# Patient Record
Sex: MALE | Race: WHITE
[De-identification: names, ages, dates, MRNs, and addresses within clinical notes are randomized per-mention and may not be internally consistent; named-entity substitution may affect disease eponyms.]

---

## 2017-11-01 NOTE — PCM.PREANE
Preanesthetic Assessment





- Anesthesia/Transfusion/Family Hx


Anesthesia History: Prior Anesthesia Without Reaction


Family History of Anesthesia Reaction: No


Transfusion History: No Prior Transfusion(s)





- Review of Systems


General: No Symptoms


Pulmonary: No Symptoms


Cardiovascular: No Symptoms


Gastrointestinal: No Symptoms


Neurological: No Symptoms


Other: Reports: None





- Physical Assessment


NPO Status Date: 10/31/17


O2 Sat by Pulse Oximetry: 97


Respiratory Rate: 16


Vital Signs: 





 Last Vital Signs











Temp  36.6 C   11/01/17 08:22


 


Pulse  91   11/01/17 08:22


 


Resp  16   11/01/17 08:22


 


BP  145/90 H  11/01/17 08:22


 


Pulse Ox  97   11/01/17 08:22











Height: 1.71 m


Weight: 65.317 kg


ASA Class: 2


Mental Status: Alert & Oriented x3


Dentition: Reports: Dentures, Partial


ROM/Head Extension: Full


Lungs: Clear to Auscultation, Normal Respiratory Effort


Cardiovascular: Regular Rate, Regular Rhythm





- Allergies


Allergies/Adverse Reactions: 


 Allergies











Allergy/AdvReac Type Severity Reaction Status Date / Time


 


No Known Allergies Allergy   Verified 11/03/14 10:50














- Anesthesia Plan


Pre-Op Medication Ordered: None





- Acknowledgements


Anesthesia Type Planned: General Anesthesia


Pt an Appropriate Candidate for the Planned Anesthesia: Yes


Alternatives and Risks of Anesthesia Discussed w Pt/Guardian: Yes


Pt/Guardian Understands and Agrees with Anesthesia Plan: Yes





PreAnesthesia Questionnaire


HEENT History: Reports: Other (See Below)


Other HEENT History: top and bottom denture


Cardiovascular History: Reports: Hypertension


Respiratory History: Reports: None


Gastrointestinal History: Reports: Hepatitis, Other (See Below)


Other Gastrointestinal History: occasional heartburn, hx hepatitis C but has 

been on medication for this and no longer has


Genitourinary History: Reports: None


Musculoskeletal History: Reports: Other (See Below)


Other Musculoskeletal History: chronic rt shoulder pain


Dermatologic History: Reports: Psoriasis





- Past Surgical History


Head Surgeries/Procedures: Reports: None


GI Surgical History: Reports: Colonoscopy, EGD





- SUBSTANCE USE


Smoking Status *Q: Current Every Day Smoker


Days Per Week of Alcohol Use: 1


Number of Drinks Per Day: 2


Total Drinks Per Week: 2


Recreational Drug Use History: No





- HOME MEDS


Home Medications: 


 Home Meds





Etanercept [Enbrel] 1 injection IM ASDIRECTED 11/03/14 [History]


Lisinopril 40 mg PO DAILY 11/03/14 [History]











- CURRENT (IN HOUSE) MEDS


Current Meds: 





 Current Medications





Lactated Ringer's (Ringers, Lactated)  1,000 mls @ 125 mls/hr IV ASDIRECTED WILBERTO


   Last Admin: 11/01/17 08:19 Dose:  125 mls/hr


Sodium Chloride (Saline Flush)  10 ml FLUSH ASDIRECTED PRN


   PRN Reason: Keep Vein Open


Sodium Chloride (Saline Flush)  2.5 ml FLUSH ASDIRECTED PRN


   PRN Reason: Keep Vein Open





Discontinued Medications





Bupivacaine HCl/Epinephrine Bitart (Marcaine 0.25%/Epinephrine 1:200,000) 

Confirm Administered Dose 20 ml .ROUTE .STK-MED ONE


   Stop: 11/01/17 07:27


Cefazolin Sodium/Dextrose 2 gm (/ Premix)  50 mls @ 100 mls/hr IV ONETIME ONE


   Stop: 10/28/17 20:21

## 2017-11-01 NOTE — OR
SURGEON:

CELINA FLORES MD

 

DATE OF PROCEDURE:  11/01/2017

 

PREOPERATIVE DIAGNOSIS:

Incarcerated right inguinal hernia.

 

POSTOPERATIVE DIAGNOSIS:

Incarcerated right inguinal hernia.

 

PROCEDURE PERFORMED:

Right inguinal hernia repair with mesh.

 

ANESTHESIA:

General endotracheal anesthesia.

 

FLUIDS:

See the anesthesia record.

 

ESTIMATED BLOOD LOSS:

10 mL.

 

COMPLICATIONS:

None.

 

INDICATIONS:

The patient is a 57-year-old male with a right inguinal pain for several months.

He was originally diagnosed with epididymitis.  The inflammation did not go

away, so he was seen by a urologist.  The urologist noted a high-riding right

testicle, but did not feel he had epididymitis.  A CT of the abdomen and pelvis

was performed that showed a right inguinal hernia containing incarcerated and

inflamed omentum.  The patient saw me in clinic and we discussed the need for an

urgent repair.  The patient and I discussed the procedure as well as expected

perioperative course.  We discussed the risks, including bleeding, infection, or

damage to surrounding structures, including loss of blood flow to the testicle.

The patient verbalized understanding and wishes to proceed.

 

PROCEDURE IN DETAIL:

The patient was brought to the OR and placed on the OR table in supine position.

A time-out was completed verifying the patient's name, age, date of birth,

allergies, and procedure to be performed.  General endotracheal anesthesia was

induced.  The abdomen, groin, and genitalia were prepped and draped in usual

standard fashion.  First the skin was anesthetized with 0.5% Marcaine plain.

Two fingerbreadths above the pubic tubercle, a transverse incision was made

using a 15 blade scalpel approximately 6 cm in length.  Dissection was carried

down with electrocautery through Ronaldo's fascia maintaining hemostasis.  Once

the external oblique was identified, it was incised along the length of its

fibers with a 15 blade scalpel.  Metzenbaum scissors were then used to extend

the incision in both directions opening up the external oblique down to the

external ring.  The external oblique was grasped with hemostats on both sides.

The cord, cord structures, as well as hernia sac were freed up circumferentially

and a Penrose drain was placed around it.  The hernia sac was identified and the

anterior medial portion of the hernia sac was stripped down.  The cord

structures were noted to be thickened and woody.  I was unable to discern the

spermatic cord from the testicular vessels.  I followed the cord structures

through the internal ring and along their course in the peritoneum to confirm

that the structure identified was the spermatic cord and vessels.  I then 
followed

this along structure down to the testicle.  The testicle was again noted to be

high riding.  The hernia sac was thin walled and contained a large amount of

omentum.  The distal aspect of this omentum appeared slightly necrotic.  I

attempted to dissect the distal aspect of the hernia sac off the cord

structures, but due to the amount of inflammation and edema, I was unable to do

so safely.  Dr. Sergio Reardon, was asked to consult on the case and verify my

anatomy.  We both agreed that the safest course of action was to leave a small

amount of necrotic omentum rather than risk compromising the testicle.  Using

cautery, I divided the omentum as close to the necrotic tissue as possible.  The

ends of this cut omentum were then tied with 2-0 silk.  The remainder of the

omentum was then dissected free of the cord structures and reduced into the

abdomen.  A Doppler was brought in to verify that there was still good blood

flow going to the testicle.  A good pulse was noted along the woody and inflamed

spermatic cord.  A large plug and patch was brought into the field.  The plug

was placed in the internal ring and secured to the transversalis fascia with 0

Ethibond sutures.  The mesh was secured to the pubic tubercle medially along the

ilioinguinal ligament inferiorly and along the conjoined tendon superiorly

making a slit for the cord and cord structures.  Once the mesh was secured in

place, the external oblique was closed over the roof with a running 0 Vicryl

suture.  This was performed in a manner to not strangulate the cord and to

recreate the external ring.  After injecting the external oblique with Marcaine,

the Ronaldo's fascia was approximated with a running 3-0 Vicryl suture.  The skin

was then closed with a running subcuticular 4-0 Monocryl suture.  Steri-Strips

and sterile dressings were applied.  The patient was taken to the PACU in stable

condition.

 

 

NKECHI RIVERA

DD:  11/01/2017 19:12:08

DT:  11/01/2017 21:04:41

Job #:  [id number]

JI

## 2017-11-01 NOTE — PCM.OPNOTE
- General Post-Op/Procedure Note


Date of Surgery/Procedure: 11/01/17


Operative Procedure(s): Right inguinal hernia repair


Findings: 


Incarcerated and partially necrotic omentum. The spermatic cord and vessels 

were thickened and inflamed. The testicle was high riding due to this 

inflamation or due to vascular compromise from the inflammation. 


Pre Op Diagnosis: Incarcerated right inguinal hernia


Post-Op Diagnosis: same


Anesthesia Technique: General ET Tube


Primary Surgeon: Flor Roblero


Condition: Good
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
## 2019-08-13 NOTE — PCM.POSTAN
POST ANESTHESIA ASSESSMENT





- MENTAL STATUS


Mental Status: Alert, Oriented





- VITAL SIGNS


Vital Signs: 


 Last Vital Signs











Temp  97.1 F   08/13/19 11:24


 


Pulse  86   08/13/19 13:41


 


Resp  15   08/13/19 13:41


 


BP  147/100 H  08/13/19 13:41


 


Pulse Ox  95   08/13/19 13:41














- RESPIRATORY


Respiratory Status: Respiratory Rate WNL, Airway Patent, O2 Saturation Stable





- CARDIOVASCULAR


CV Status: Pulse Rate WNL, Blood Pressure Stable





- GASTROINTESTINAL


GI Status: No Symptoms





- POST OP HYDRATION


Hydration Status: Adequate & Stable

## 2019-08-13 NOTE — CR
HISTORY:



Chest pain and shortness of breath. 



COMPARISON:



01/20/2018 



FINDINGS:



A portable erect AP view of the chest was obtained at 1128 hours. 



The lungs remain clear. No focal or diffuse infiltrates are present. 



The previously seen appearance of hyperinflation is no longer evident. 



Incidental note is made of a rounded density superimposed over the left 

lateral lower lung, probably a nipple shadow. 



The heart remains normal in size. 



The mediastinum is normal in appearance. 



The osseous structures are normal in appearance for the patient`s age. 



IMPRESSION:



Normal portable chest single view.



Dictated by Yifan Snider MD @ Aug 13 2019 12:11PM



Signed by Dr. Yifan Snider @ Aug 13 2019 12:14PM

## 2019-08-13 NOTE — PCM.CONS
H&P History of Present Illness





- General


Date of Service: 08/13/19


Admit Problem/Dx: 





Foreign body obstruction of the esophagus.  Inability to swallow secretions.


Source of Information: Patient


History Limitations: Reports: No Limitations





- History of Present Illness


Initial Comments - Free Text/Narative: 


Patient is a 59-year-old gentleman, who was eating dinner last night.  They 

were having roast beef and he took a large bite of food.  Immediately he was 

unable to swallow his own secretions.  He elected not to present to the 

emergency room until today.  He has never had a foreign body obstruction of the 

esophagus in the past and never required emergent endoscopy.





Symptom Onset Date: 08/12/19


Symptom Onset Time: 18:00


Duration of Symptoms: Reports: Hour(s):, Getting Worse


Location: Reports: Chest, Abdomen


Quality: Reports: Pressure


Severity: Severe


Improves with: Reports: None


Worsens with: Reports: Eating


Context: Reports: Sick Contact


Associated Symptoms: Reports: No Other Symptoms





- Related Data


Allergies/Adverse Reactions: 


 Allergies











Allergy/AdvReac Type Severity Reaction Status Date / Time


 


No Known Allergies Allergy   Verified 08/13/19 11:23











Home Medications: 


 Home Meds





Lisinopril 40 mg PO DAILY 11/03/14 [History]











Past Medical History


HEENT History: Reports: Other (See Below)


Other HEENT History: top and bottom denture


Cardiovascular History: Reports: Hypertension


Respiratory History: Reports: None


Gastrointestinal History: Reports: Hepatitis, Other (See Below)


Other Gastrointestinal History: occasional heartburn, hx hepatitis C but has 

been on medication for this and no longer has


Genitourinary History: Reports: None


Musculoskeletal History: Reports: Other (See Below)


Other Musculoskeletal History: chronic rt shoulder pain


Dermatologic History: Reports: Psoriasis





- Infectious Disease History


Infectious Disease History: Reports: Chicken Pox





- Past Surgical History


Head Surgeries/Procedures: Reports: None


GI Surgical History: Reports: Colonoscopy, EGD





Social & Family History





- Family History


Family Medical History: Noncontributory





- Tobacco Use


Smoking Status *Q: Current Every Day Smoker


Years of Tobacco use: 20


Packs/Tins Daily: 0.3





- Caffeine Use


Caffeine Use: Reports: Coffee





- Alcohol Use


Days Per Week of Alcohol Use: 7


Number of Drinks Per Day: 1


Total Drinks Per Week: 7





- Recreational Drug Use


Recreational Drug Use: No





H&P Review of Systems





- Review of Systems:


Review Of Systems: See Below


General: Denies: Fever, Chills, Malaise, Weakness, Fatigue


HEENT: Reports: No Symptoms


Pulmonary: Denies: Shortness of Breath, Wheezing


Cardiovascular: Reports: Chest Pain.  Denies: Palpitations, Dyspnea on Exertion


Gastrointestinal: Reports: Abdominal Pain, Anorexia, Decreased Appetite, 

Difficulty Swallowing.  Denies: Black Stool, Bloody Stool, Constipation, 

Diarrhea, Distension


Genitourinary: Reports: No Symptoms


Musculoskeletal: Reports: No Symptoms


Skin: Reports: No Symptoms


Psychiatric: Reports: No Symptoms


Neurological: Reports: No Symptoms


Hematologic/Lymphatic: Reports: No Symptoms


Immunologic: Reports: No Symptoms





Exam





- Exam


Exam: See Below





- Vital Signs


Vital Signs: 


 Last Vital Signs











Temp  97.1 F   08/13/19 11:24


 


Pulse  93   08/13/19 12:06


 


Resp  18   08/13/19 12:06


 


BP  171/120 H  08/13/19 12:06


 


Pulse Ox  97   08/13/19 12:06











Weight: 150 lb





- Exam


General: Alert, Oriented, Cooperative, Mild Distress


HEENT: Conjunctiva Clear, Pupils Equal, Pupils Reactive.  No: Scleral Icterus


Neck: Supple, Trachea Midline


Lungs: Clear to Auscultation, Normal Respiratory Effort


Cardiovascular: Regular Rate, Regular Rhythm


GI/Abdominal Exam: Normal Bowel Sounds, Soft, Non-Tender, No Organomegaly, No 

Distention


 (Male) Exam: Normal Inspection


Rectal (Males) Exam: Deferred


Back Exam: Normal Inspection


Extremities: Normal Inspection


Skin: Warm, Dry, Intact


Neurological: Cranial Nerves Intact


Psychiatric: Alert, Normal Affect, Normal Mood





Consult PN Assessment/Plan


Procedures: 


Procedures





ASSAY OF TROPONIN QUANT (01/20/18)


CHEST X-RAY 2VW FRONTAL&LATL (10/27/17)


COMPLETE CBC AUTOMATED (10/27/17)


COMPLETE CBC W/AUTO DIFF WBC (01/20/18)


COMPREHEN METABOLIC PANEL (01/20/18)


CT ABD & PELV 1/> REGNS (05/02/19)


CT ABD & PELV W/CONTRAST (08/10/18)


CT ABD & PELVIS W/O CONTRAST (10/25/17)


CT NECK SPINE W/O DYE (01/20/18)


DIAGNOSTIC COLONOSCOPY (11/04/14)


ECHO EXAM OF ABDOMEN (10/30/14)


EGD BIOPSY SINGLE/MULTIPLE (11/04/14)


ELECTROCARDIOGRAM TRACING (01/20/18)


EMERGENCY DEPT VISIT (01/20/18)


PROTHROMBIN TIME (01/20/18)


PRP I/ANURAG INIT BLOCK >5 YR (11/01/17)


ROUTINE VENIPUNCTURE (01/20/18)


THER/PROPH/DIAG INJ IV PUSH (01/20/18)


TISSUE EXAM BY PATHOLOGIST (11/04/14)


TX/PRO/DX INJ SAME DRUG ADON (01/20/18)


URINALYSIS AUTO W/SCOPE (10/25/17)


US EXAM SCROTUM (10/19/17)


X-RAY EXAM CHEST 1 VIEW (01/20/18)


X-RAY EXAM L-S SPINE 2/3 VWS (01/20/18)


X-RAY EXAM OF ELBOW (01/20/18)


X-RAY EXAM OF PELVIS (01/20/18)








(1) Esophageal obstruction due to food impaction


SNOMED Code(s): 944787429


   Code(s): K22.2 - ESOPHAGEAL OBSTRUCTION; T18.128A - FOOD IN ESOPHAGUS 

CAUSING OTHER INJURY, INITIAL ENCOUNTER   Priority: High   Current Visit: Yes   


Problem List Initiated/Reviewed/Updated: Yes


My Orders Last 24 Hours: 


My Active Orders





08/13/19 12:12


Oxygen Therapy [RC] PRN 


Vital Signs [RC] PER UNIT ROUTINE 


Resuscitation Status Routine 





08/13/19 12:15


Lactated Ringers @ 125 MLS/HR(1000ml) Lactated Ringers [Ringers, Lactated] 1,

000 ml IV ASDIRECTED 





08/13/19 Lunch


Nothing per Oral After Midnight Diet [DIET] 











Plan: 


Esophagogastroduodenoscopy with removal of esophageal foreign body and biopsy. 

The operative procedure, along with the risks, including, but not limited to, 

bleeding, perforation, and the need for surgery were discussed with the patient 

who voices understanding, offers no questions and wishes to proceed.  Patient 

has been made aware of his increased risk for perforation given the fact that 

the obstruction has been present for 18 hours.

## 2019-08-13 NOTE — OR
SURGEON:

Sergio Reardon M.D.

 

DATE OF PROCEDURE:  08/13/2019

 

OPERATION PERFORMED:

Esophagogastroduodenoscopy with removal of esophageal foreign body and antral

and gastric biopsy.

 

PRIMARY SURGEON:

Sergio Reardon MD.

 

ANESTHESIA:

General endotracheal.

 

ASA CLASSIFICATION:

IIE.

 

PREOPERATIVE DIAGNOSIS:

Foreign body obstruction of the esophagus.

 

POSTOPERATIVE DIAGNOSIS:

Foreign body obstruction of the esophagus.

 

DESCRIPTION OF PROCEDURE:

The patient was taken to the operating room, placed on the operating table in

the supine position.  Time-out was called for appropriate identification of the

patient and procedure.  Following satisfactory attainment of general

endotracheal anesthesia, the bite block was placed between the patient's teeth.

The gastroscope was inserted through the bite block into the oropharynx and

advanced down to the foreign body obstruction.  I attempted to remove this en

bloc with the 3-pronged grasper, but was unable to do so.  Using the biopsy

forceps, I was able to break up the foreign body and eventually pass it into the

stomach.  At that point, the gastroscope easily passed through the distal

esophagus into the stomach and was advanced into the duodenum where examination

was now carried out in a retrograde fashion.  The duodenum shows no acute

ulcerations.  The stomach does show mild to moderate gastritis.  Antral biopsies

were obtained to look for the presence of Helicobacter pylori.  The gastroscope

was retroflexed to visualize the proximal stomach, which does show a very

proximal gastritis secondary to irritation from the foreign body.  The proximal

stomach does show mild to moderate gastritis and separate biopsies of the

greater curvature were obtained.  The gastroscope was then withdrawn through the

GE junction into the distal esophagus, which is very acutely inflamed.  There

was no obvious perforation of the esophagus noted on examination today.  There

was some bleeding present secondary to the duration of time that the foreign

body had been there.  The proximal and mid esophagus showed no acute

inflammatory lesions.  No Zenker's diverticulum was noted.  As the patient was

intubated, I was not able to visualize the vocal cords.  The gastroscope was

then removed with the patient having tolerated the procedure well.  Following

emergence from anesthesia and extubation, he was taken to recovery room in

satisfactory condition.

 

 

GLADIS / MIGUEL

DD:  08/13/2019 12:58:27

DT:  08/13/2019 14:00:28

Job #:  [id number]

## 2019-08-13 NOTE — [id number]
Post Anesthesia Note





- EVALUATION WITHIN 48HRS OF ANESTHETIC


Vital Signs in Normal Range: Yes


Patient Participated in Evaluation: Yes


Respiratory Function Stable: Yes


Airway Patent: Yes


Cardiovascular Function Stable: Yes


Hydration Status Stable: Yes


Pain Control Satisfactory: Yes


Nausea and Vomiting Control Satisfactory: Yes


Mental Status Recovered: Yes


Vital Signs: 


 Last Vital Signs











Temp  97.1 F   08/13/19 11:24


 


Pulse  86   08/13/19 13:41


 


Resp  15   08/13/19 13:41


 


BP  147/100 H  08/13/19 13:41


 


Pulse Ox  95   08/13/19 13:41

## 2019-08-13 NOTE — EDM.PDOC
ED HPI GENERAL MEDICAL PROBLEM





- General


Chief Complaint: Gastrointestinal Problem


Stated Complaint: SOMETHING STUCK IN THROAT,REF'D BY [facility]


Time Seen by Provider: 08/13/19 11:22


Source of Information: Reports: Patient


History Limitations: Reports: No Limitations





- History of Present Illness


INITIAL COMMENTS - FREE TEXT/NARRATIVE: 


History of present illness:


[]Patient was eating roast beef last night around 6 PM and a meat bolus stuck 

in his esophagus. She has not been able to swallow his saliva





Review of systems: 


As per history of present illness and below otherwise all systems reviewed and 

negative.





Past medical history: 


As per history of present illness and as reviewed below otherwise 

noncontributory.





Surgical history: 


As per history of present illness and as reviewed below otherwise 

noncontributory.





Social history: 


No reported history of drug or alcohol abuse.





Family history: 


As per history of present illness and as reviewed below otherwise 

noncontributory.





Physical exam:


General: Well developed, well nourished in NAD


HEENT: Atraumatic, normocephalic, pupils reactive, negative for conjunctival 

pallor or scleral icterus, mucous membranes moist, throat clear, neck supple, 

nontender, trachea midline. No stridor


Lungs: Clear to auscultation, breath sounds equal bilaterally, chest nontender.


Heart: S1S2, regular, negative for clicks, rubs, or JVD.


Abdomen: NABS, Soft, nondistended, nontender. Negative for masses or 

hepatosplenomegaly. Negative for costovertebral tenderness.


Pelvis: Stable nontender.


Genitourinary: Deferred.


Rectal: Deferred.


Extremities: Atraumatic, negative for cords or calf pain. Neurovascular 

unremarkable.


Neuro: Awake, alert, oriented. Cranial nerves II through XII unremarkable. 

Cerebellum unremarkable. Motor and sensory unremarkable throughout. Exam 

nonfocal.


Skin:warm and dry





Diagnostics:


Chest x-ray





Therapeutics:


Glucagon





ED Course:


Consulted general surgery who is taking him to endoscopy suite for esophageal 

foreign body removal





Impression: 


Esophageal foreign body





Prescriptions:


None





Plan:


admit today surgery





Definitive disposition and diagnosis as appropriate pending reevaluation and 

review of above.








- Related Data


 Allergies











Allergy/AdvReac Type Severity Reaction Status Date / Time


 


No Known Allergies Allergy   Verified 08/13/19 11:23











Home Meds: 


 Home Meds





Lisinopril 40 mg PO DAILY 11/03/14 [History]











Past Medical History


HEENT History: Reports: Other (See Below)


Other HEENT History: top and bottom denture


Cardiovascular History: Reports: Hypertension


Respiratory History: Reports: None


Gastrointestinal History: Reports: Hepatitis, Other (See Below)


Other Gastrointestinal History: occasional heartburn, hx hepatitis C but has 

been on medication for this and no longer has


Genitourinary History: Reports: None


Musculoskeletal History: Reports: Other (See Below)


Other Musculoskeletal History: chronic rt shoulder pain


Dermatologic History: Reports: Psoriasis





- Infectious Disease History


Infectious Disease History: Reports: Chicken Pox





- Past Surgical History


Head Surgeries/Procedures: Reports: None


GI Surgical History: Reports: Colonoscopy, EGD





Social & Family History





- Family History


Family Medical History: Noncontributory





- Tobacco Use


Smoking Status *Q: Current Every Day Smoker


Years of Tobacco use: 20


Packs/Tins Daily: 0.3





- Caffeine Use


Caffeine Use: Reports: Coffee





- Alcohol Use


Days Per Week of Alcohol Use: 7


Number of Drinks Per Day: 1


Total Drinks Per Week: 7





- Recreational Drug Use


Recreational Drug Use: No





ED ROS GENERAL





- Review of Systems


Review Of Systems: See Below





ED EXAM, GI/ABD





- Physical Exam


Exam: See Below





Course





- Vital Signs


Last Recorded V/S: 





 Last Vital Signs











Temp  97.1 F   08/13/19 11:24


 


Pulse  91   08/13/19 11:24


 


Resp  18   08/13/19 11:24


 


BP  166/109 H  08/13/19 11:24


 


Pulse Ox  99   08/13/19 11:24














- Orders/Labs/Meds


Orders: 





 Active Orders 24 hr











 Category Date Time Status


 


 Chest 1V Frontal [CR] Stat Exams  08/13/19 11:22 Taken











Meds: 





Medications














Discontinued Medications














Generic Name Dose Route Start Last Admin





  Trade Name Paulina  PRN Reason Stop Dose Admin


 


Glucagon  1 mg  08/13/19 11:22  08/13/19 11:33





  Glucagen  IVPUSH  08/13/19 11:23  1 mg





  ONETIME ONE   Administration





     





     





     





     














Departure





- Departure


Time of Disposition: 12:04


Disposition: Still A Patient 30


Condition: Good


Clinical Impression: 


 Esophageal obstruction due to food impaction








- Discharge Information


*PRESCRIPTION DRUG MONITORING PROGRAM REVIEWED*: Not Applicable


*COPY OF PRESCRIPTION DRUG MONITORING REPORT IN PATIENT JENNY: Not Applicable


Referrals: 


Bruce Jovel NP [Primary Care Provider] - 





- My Orders


Last 24 Hours: 





My Active Orders





08/13/19 11:22


Chest 1V Frontal [CR] Stat 














- Assessment/Plan


Last 24 Hours: 





My Active Orders





08/13/19 11:22


Chest 1V Frontal [CR] Stat

## 2019-08-13 NOTE — PCM.OPNOTE
- General Post-Op/Procedure Note


Date of Surgery/Procedure: 08/13/19


Operative Procedure(s): Esophagogastroduodenoscopy with removal of esophageal 

foreign body and gastric biopsies


Pre Op Diagnosis: Foreign body obstruction of the esophagus


Post-Op Diagnosis: Foreign body obstruction of the esophagus.  Acute and 

chronic gastritis.


Anesthesia Technique: General ET Tube (ASA IIE)


Primary Surgeon: Sergio Reardon


Condition: Fair


Free Text/Narrative:: 





DICTATION [id number]





CPT CODE 25906

## 2019-08-13 NOTE — PCM.PREANE
Preanesthetic Assessment





- Anesthesia/Transfusion/Family Hx


Anesthesia History: Prior Anesthesia Without Reaction


Family History of Anesthesia Reaction: No


Transfusion History: No Prior Transfusion(s)





- Review of Systems


Pulmonary: No Symptoms


Cardiovascular: No Symptoms


Gastrointestinal: Difficulty Swallowing


Neurological: No Symptoms


Other: Reports: None





- Physical Assessment


NPO Status Date: 08/12/19


Vital Signs: 





 Last Vital Signs











Temp  97.1 F   08/13/19 11:24


 


Pulse  91   08/13/19 12:20


 


Resp  19   08/13/19 12:20


 


BP  177/119 H  08/13/19 12:20


 


Pulse Ox  98   08/13/19 12:20











Height: 5 ft 8 in


Weight: 68.039 kg


ASA Class: 2


Mental Status: Alert & Oriented x3


Airway Class: Mallampati = 2


Dentition: Reports: Broken Tooth/Teeth, Missing Tooth/Teeth


ROM/Head Extension: Other


Lungs: Clear to Auscultation


Cardiovascular: Regular Rate, Regular Rhythm





- Allergies


Allergies/Adverse Reactions: 


 Allergies











Allergy/AdvReac Type Severity Reaction Status Date / Time


 


No Known Allergies Allergy   Verified 08/13/19 11:23














- Blood


Blood Available: No





- Anesthesia Plan


Pre-Op Medication Ordered: None





- Acknowledgements


Anesthesia Type Planned: General Anesthesia


Pt an Appropriate Candidate for the Planned Anesthesia: Yes


Alternatives and Risks of Anesthesia Discussed w Pt/Guardian: Yes


Pt/Guardian Understands and Agrees with Anesthesia Plan: Yes





PreAnesthesia Questionnaire


HEENT History: Reports: Other (See Below)


Other HEENT History: top and bottom denture


Cardiovascular History: Reports: Hypertension


Respiratory History: Reports: None


Gastrointestinal History: Reports: Hepatitis, Other (See Below)


Other Gastrointestinal History: occasional heartburn, hx hepatitis C but has 

been on medication for this and no longer has


Genitourinary History: Reports: None


Musculoskeletal History: Reports: Other (See Below)


Other Musculoskeletal History: chronic rt shoulder pain


Dermatologic History: Reports: Psoriasis





- Infectious Disease History


Infectious Disease History: Reports: Chicken Pox





- Past Surgical History


Head Surgeries/Procedures: Reports: None


GI Surgical History: Reports: Colonoscopy, EGD





- SUBSTANCE USE


Smoking Status *Q: Current Every Day Smoker


Tobacco Use Within Last Twelve Months: Cigarettes


Days Per Week of Alcohol Use: 7


Number of Drinks Per Day: 1


Total Drinks Per Week: 7


Recreational Drug Use History: No





- HOME MEDS


Home Medications: 


 Home Meds





Lisinopril 40 mg PO DAILY 11/03/14 [History]











- CURRENT (IN HOUSE) MEDS


Current Meds: 





 Current Medications





Lactated Ringer's (Ringers, Lactated)  1,000 mls @ 125 mls/hr IV ASDIRECTED [facility]


   Last Admin: 08/13/19 12:19 Dose:  125 mls/hr





Discontinued Medications





Glucagon (Glucagen)  1 mg IVPUSH ONETIME ONE


   Stop: 08/13/19 11:23


   Last Admin: 08/13/19 11:33 Dose:  1 mg

## 2020-06-29 NOTE — EDM.PDOC
ED HPI GENERAL MEDICAL PROBLEM





- General


Chief Complaint: ENT Problem


Stated Complaint: FOOD LODGED IN ESOPHAGUS


Time Seen by Provider: 06/29/20 12:08


Source of Information: Reports: Patient


History Limitations: Reports: No Limitations





- History of Present Illness


INITIAL COMMENTS - FREE TEXT/NARRATIVE: 


HISTORY AND PHYSICAL:





History of present illness:


Patient is a 60-year-old male who presents to the emergency room with complaints

of esophageal foreign body.  Last evening he was eating steak when he felt the 

sensation that "something is lodged" in his throat.  He states this last 

happened in 2019 when he had to have an EGD done by our general surgeon.  He has

made multiple attempts to drink fluids but "somehow they keep coming back up".  

He has not taken his lisinopril yet today as he feels he cannot keep anything 

down.  He has no difficulty with breathing and offers no other complaints or 

concerns at this time.





Review of systems: 


As per history of present illness and below otherwise all systems reviewed and 

negative.





Past medical history: 


As per history of present illness and as reviewed below otherwise 

noncontributory.





Surgical history: 


As per history of present illness and as reviewed below otherwise 

noncontributory.





Social history: 


See social history for further information





Family history: 


As per history of present illness and as reviewed below otherwise 

noncontributory.





Physical exam:


General: Well-developed and very thin appearing 60-year-old male.  Alert and 

oriented.  Nontoxic-appearing and in no acute distress.


HEENT: Atraumatic, normocephalic, pupils equal and reactive bilaterally, 

negative for conjunctival pallor or scleral icterus, mucous membranes moist, TMs

normal bilaterally, throat clear, neck supple, nontender, trachea midline. No 

drooling or trismus noted. No meningeal signs. No hot potato voice noted. 


Lungs: Clear to auscultation, breath sounds equal bilaterally, chest nontender.


Heart: S1S2, regular rate and rhythm without overt murmur


Abdomen: Soft, nondistended, nontender. Negative for masses or 

hepatosplenomegaly. Negative for costovertebral tenderness.


Skin: Intact, warm, dry. No lesions or rashes noted.


Extremities: Atraumatic, moves all extremities per self without difficulty or 

deficits, negative for cords or calf pain. Neurovascular unremarkable.


Neuro: Awake, alert, oriented. Cranial nerves II through XII unremarkable. 

Cerebellum unremarkable. Motor and sensory unremarkable throughout. Exam 

nonfocal.





Notes:


Physical examination is within normal limits.  Patient's blood pressure is 

elevated, he states he was not able to take his lisinopril today.  He is 

asymptomatic of any cardiovascular or respiratory complaints at this time.





Glucagon and PO challenge was done, unsuccessful.  Patient is unable to swallow 

any fluids.  I did page the general surgeon on call.


1240: Dr Reardon was called, he will come in and see the patient for 

evaluation. 


1300-Dr. Reardon here to evaluate the patient.  He will take him to back for 

EGD.  Patient is aware and agreeable to plan of care.





Diagnostics:


COVID screening





Therapeutics:


Glucagon





Prescription:


None





Impression: 


Esophageal foreign body








Definitive disposition and diagnosis as appropriate pending reevaluation and 

review of above.








- Related Data


                                    Allergies











Allergy/AdvReac Type Severity Reaction Status Date / Time


 


No Known Allergies Allergy   Verified 06/29/20 12:12











Home Meds: 


                                    Home Meds





Lisinopril 20 mg PO DAILY 11/03/14 [History]











Past Medical History


HEENT History: Reports: Other (See Below)


Other HEENT History: top and bottom denture


Cardiovascular History: Reports: Hypertension


Respiratory History: Reports: None


Gastrointestinal History: Reports: Hepatitis, Other (See Below)


Other Gastrointestinal History: occasional heartburn, hx hepatitis C but has 

been on medication for this and no longer has


Genitourinary History: Reports: None


Musculoskeletal History: Reports: Other (See Below)


Other Musculoskeletal History: chronic rt shoulder pain


Dermatologic History: Reports: Psoriasis





- Infectious Disease History


Infectious Disease History: Reports: Chicken Pox





- Past Surgical History


Head Surgeries/Procedures: Reports: None


GI Surgical History: Reports: Colonoscopy, EGD





Social & Family History





- Family History


Family Medical History: Noncontributory





- Caffeine Use


Caffeine Use: Reports: Coffee





ED ROS ENT





- Review of Systems


Review Of Systems: Comprehensive ROS is negative, except as noted in HPI.





ED EXAM, ENT





- Physical Exam


Exam: See Below (See dictation)





Course





- Vital Signs


Last Recorded V/S: 


                                Last Vital Signs











Temp  97.9 F   06/29/20 15:42


 


Pulse  99   06/29/20 15:58


 


Resp  20   06/29/20 15:58


 


BP  116/77   06/29/20 15:58


 


Pulse Ox  96   06/29/20 15:58














- Orders/Labs/Meds


Orders: 


                               Active Orders 24 hr











 Category Date Time Status


 


 Admission Status [Patient Status] [ADT] Stat ADT  06/29/20 13:09 Active


 


 Oxygen Therapy [RC] PRN Care  06/29/20 13:57 Active


 


 Pulse Oximetry [RC] INTERMITTENT Care  06/29/20 15:33 Active


 


 Ready for Discharge [RC] PER UNIT ROUTINE Care  06/29/20 15:37 Active


 


 Up ad Stephanie [RC] ASDIRECTED Care  06/29/20 15:33 Active


 


 Vital Signs [RC] PER UNIT ROUTINE Care  06/29/20 13:57 Active


 


 Vital Signs [RC] PER UNIT ROUTINE Care  06/29/20 15:33 Active


 


 Advance Diet Instructions [DIET] Diet  06/29/20 Breakfast Active


 


 Nothing per Oral After Midnight Diet [DIET] Diet  06/29/20 Lunch Active


 


 Lactated Ringers [Ringers, Lactated] 1,000 ml Med  06/29/20 14:00 Active





 IV ASDIRECTED   


 


 Lactated Ringers [Ringers, Lactated] 1,000 ml Med  06/29/20 15:45 Active





 IV ASDIRECTED   


 


 Resuscitation Status Routine Resus Stat  06/29/20 13:57 Ordered








                                Medication Orders





Lactated Ringer's (Ringers, Lactated)  1,000 mls @ 125 mls/hr IV ASDIRECTED WILBERTO


   Last Admin: 06/29/20 14:10  Dose: 125 mls/hr


   Documented by: SIERRA


Lactated Ringer's (Ringers, Lactated)  1,000 mls @ 125 mls/hr IV ASDIRECTED WILBERTO








Labs: 


                                Laboratory Tests











  06/29/20 Range/Units





  13:15 


 


SARS-CoV-2 RNA (RT-PCR)  NEGATIVE  (NEGATIVE)  











Meds: 


Medications











Generic Name Dose Route Start Last Admin





  Trade Name Freq  PRN Reason Stop Dose Admin


 


Lactated Ringer's  1,000 mls @ 125 mls/hr  06/29/20 14:00  06/29/20 14:10





  Ringers, Lactated  IV   125 mls/hr





  ASDIRECTED WILBERTO   Administration


 


Lactated Ringer's  1,000 mls @ 125 mls/hr  06/29/20 15:45 





  Ringers, Lactated  IV  





  ASDIRECTED WILBERTO  














Discontinued Medications














Generic Name Dose Route Start Last Admin





  Trade Name Freq  PRN Reason Stop Dose Admin


 


Fentanyl  Confirm  06/29/20 14:24 





  Sublimaze  Administered  06/29/20 14:25 





  Dose  





  100 mcg  





  .ROUTE  





  .STK-MED ONE  


 


Glucagon  1 mg  06/29/20 12:12  06/29/20 12:14





  Glucagen  IVPUSH  06/29/20 12:13  1 mg





  ONETIME ONE   Administration


 


Glucagon  Confirm  06/29/20 12:12  06/29/20 12:14





  Glucagen  Administered  06/29/20 12:13  Not Given





  Dose  





  1 mg  





  .ROUTE  





  .STK-MED ONE  


 


Glycopyrrolate  Confirm  06/29/20 14:26 





  Robinul  Administered  06/29/20 14:27 





  Dose  





  0.2 mg  





  .ROUTE  





  .STK-MED ONE  


 


Hydralazine HCl  5 mg  06/29/20 14:17  06/29/20 14:32





  Apresoline  IVPUSH  06/29/20 14:18  5 mg





  ONETIME ONE   Administration


 


Hydralazine HCl  5 mg  06/29/20 14:44  06/29/20 14:45





  Apresoline  IVPUSH  06/29/20 14:45  5 mg





  ONETIME ONE   Administration


 


Lidocaine HCl  Confirm  06/29/20 14:26 





  Xylocaine-Mpf 1%  Administered  06/29/20 14:27 





  Dose  





  5 ml  





  .ROUTE  





  .STK-MED ONE  


 


Midazolam HCl  Confirm  06/29/20 14:24 





  Versed 1 Mg/Ml  Administered  06/29/20 14:25 





  Dose  





  2 mg  





  .ROUTE  





  .STK-MED ONE  


 


Ondansetron HCl  Confirm  06/29/20 14:26 





  Zofran  Administered  06/29/20 14:27 





  Dose  





  4 mg  





  .ROUTE  





  .STK-MED ONE  


 


Propofol  Confirm  06/29/20 14:24 





  Diprivan  20 Ml  Administered  06/29/20 14:25 





  Dose  





  200 mg  





  .ROUTE  





  .STK-MED ONE  














Departure





- Departure


Time of Disposition: 16:42


Disposition: Still A Patient 30


Clinical Impression: 


Esophageal foreign body


Qualifiers:


 Encounter type: initial encounter Qualified Code(s): T18.108A - Unspecified 

foreign body in esophagus causing other injury, initial encounter








- Discharge Information





Sepsis Event Note (ED)





- Focused Exam


Vital Signs: 


                                   Vital Signs











  Temp Pulse Resp BP Pulse Ox


 


 06/29/20 15:58   99  20  116/77  96


 


 06/29/20 15:53   98  18  123/82  98


 


 06/29/20 15:48   96  16  124/77  99


 


 06/29/20 15:42  97.9 F  95  16  135/75  99


 


 06/29/20 14:52     157/91 H 


 


 06/29/20 14:39     171/109 H 


 


 06/29/20 14:32     207/115 H 


 


 06/29/20 14:15   83   184/108 H 


 


 06/29/20 14:05  97.5 F  90  16  183/122 H  87 L


 


 06/29/20 14:01  97.3 F  101 H  17  164/109 H  98


 


 06/29/20 12:10  96.4 F L  96  17  161/113 H  98














- My Orders


Last 24 Hours: 


My Active Orders





06/29/20 13:09


Admission Status [Patient Status] [ADT] Stat 














- Assessment/Plan


Last 24 Hours: 


My Active Orders





06/29/20 13:09


Admission Status [Patient Status] [ADT] Stat

## 2020-06-29 NOTE — OR
SURGEON:

Sergio Reardon M.D.

 

DATE OF PROCEDURE:  06/29/2020

 

OPERATION PERFORMED:

Esophagogastroduodenoscopy with removal of esophageal foreign body and gastric

biopsy.

 

PRIMARY SURGEON:

Sergio Reardon MD

 

ANESTHESIA:

General endotracheal.

 

ASA CLASSIFICATION:

IV.

 

PREOPERATIVE DIAGNOSIS:

Foreign body obstruction of the esophagus.

 

POSTOPERATIVE DIAGNOSES:

1. Foreign body obstruction of the esophagus.

2. Severe acute esophagitis.

3. Gastritis.

 

DESCRIPTION OF PROCEDURE:

The patient was taken to the endoscopy room and positioned on the endoscopy

table in the supine position.  Time-out was called for appropriate

identification of the patient and procedure.  Following satisfactory attainment

of general endotracheal anesthesia, gastroscope was inserted through the bite

block into the oropharynx and advanced to the mid esophagus.  Immediately, there

was a large esophageal foreign body present.  This could not easily be broken up

and required multiple passes of the cold biopsy forceps and the three-prong

grasper.  Even grasping it with the three-prong, I was not able to completely

remove it and so it was broken up in a piecemeal fashion.  Ultimately, it was

broken up small enough that I could push it through the distal esophagus into

the stomach.  The scope was then further advanced through the stomach into the

duodenum where examination was carried out in a retrograde fashion.  The

duodenum did show one small superficial ulcer, no acute bleeding was noted.  The

gastroscope was withdrawn to the stomach and antral biopsies were obtained to

look for the presence of Helicobacter pylori.  The gastroscope was retroflexed

to again visualize the proximal stomach, confirming that the foreign body was

indeed now in the stomach.  The gastroscope was then straightened and slowly

withdrawn.  The midportion to distal portion of the esophagus demonstrates very

severe acute esophagitis consistent with the fact that this foreign body had

been present for more than 12 hours.  There was no obvious tear in the esophagus

and the proximal esophagus did not show any acute inflammatory changes.  The

patient was intubated, therefore, the vocal cords were visualized at the time of

intubation, but not now.  The gastroscope was then removed with the patient

having tolerated the procedure well.  He was taken to recovery room in stable

condition.

 

 

GLADIS / MIGUEL

DD:  06/29/2020 15:34:12

DT:  06/29/2020 16:43:47

Job #:  [id number]

JI

## 2020-06-29 NOTE — PCM48HPAN
Post Anesthesia Note





- EVALUATION WITHIN 48HRS OF ANESTHETIC


Vital Signs in Normal Range: Yes


Patient Participated in Evaluation: Yes


Respiratory Function Stable: Yes


Airway Patent: Yes


Cardiovascular Function Stable: Yes


Hydration Status Stable: Yes


Pain Control Satisfactory: Yes


Nausea and Vomiting Control Satisfactory: Yes


Mental Status Recovered: Yes


Vital Signs: 


                                Last Vital Signs











Temp  36.6 C   06/29/20 15:42


 


Pulse  99   06/29/20 15:58


 


Resp  20   06/29/20 15:58


 


BP  116/77   06/29/20 15:58


 


Pulse Ox  96   06/29/20 15:58

## 2020-06-29 NOTE — PCM.OPNOTE
- General Post-Op/Procedure Note


Date of Surgery/Procedure: 06/29/20


Operative Procedure(s): Esophagogastroduodenoscopy with removal of esophageal 

foreign body and gastric biopsies


Pre Op Diagnosis: Foreign body obstruction of the esophagus


Post-Op Diagnosis: Foreign body obstruction of the esophagus.  Acute 

esophagitis.


Anesthesia Technique: General ET Tube (ASA IV)


Primary Surgeon: Sergio Reardon


Condition: Undetermined


Free Text/Narrative:: 


DICTATION [id number]





CPT CODE 52317

## 2020-06-29 NOTE — PCM.CONS
H&P History of Present Illness





- General


Date of Service: 06/29/20


Admit Problem/Dx: 


                           Admission Diagnosis/Problem





Admission Diagnosis/Problem      Foreign body in esophagus








Source of Information: Patient


History Limitations: Reports: No Limitations





- History of Present Illness


Initial Comments - Free Text/Narative: 


Patient is a 60-year-old gentleman who presented the emergency room today again 

with difficulty swallowing and unable to keep his secretions down.  He states he

was eating some ribs last night, and they apparently became stuck in his 

esophagus.  He does have a history of foreign body obstruction in the esophagus 

and did undergo emergent endoscopy with removal of a foreign body one-year ago. 

Denies any chest pain today.





Symptom Onset Date: 06/28/20


Location: Reports: Chest


Quality: Reports: Pressure


Severity: Moderate


Improves with: Reports: None


Worsens with: Reports: None


Associated Symptoms: Reports: No Other Symptoms





- Related Data


Allergies/Adverse Reactions: 


                                    Allergies











Allergy/AdvReac Type Severity Reaction Status Date / Time


 


No Known Allergies Allergy   Verified 06/29/20 12:12











Home Medications: 


                                    Home Meds





Lisinopril 20 mg PO DAILY 11/03/14 [History]











Past Medical History


HEENT History: Reports: Other (See Below)


Other HEENT History: top and bottom denture


Cardiovascular History: Reports: Hypertension


Respiratory History: Reports: None


Gastrointestinal History: Reports: Hepatitis, Other (See Below)


Other Gastrointestinal History: occasional heartburn, hx hepatitis C but has 

been on medication for this and no longer has


Genitourinary History: Reports: None


Musculoskeletal History: Reports: Other (See Below)


Other Musculoskeletal History: chronic rt shoulder pain


Neurological History: Reports: None


Psychiatric History: Reports: None


Endocrine/Metabolic History: Reports: None


Hematologic History: Reports: None


Immunologic History: Reports: None


Oncologic (Cancer) History: Reports: None


Dermatologic History: Reports: Psoriasis





- Infectious Disease History


Infectious Disease History: Reports: Chicken Pox





- Past Surgical History


Head Surgeries/Procedures: Reports: None


GI Surgical History: Reports: Colonoscopy, EGD





Social & Family History





- Family History


Family Medical History: Noncontributory





- Tobacco Use


Smoking Status *Q: Current Every Day Smoker


Years of Tobacco use: 20


Packs/Tins Daily: 0.1





- Caffeine Use


Caffeine Use: Reports: Coffee





- Alcohol Use


Days Per Week of Alcohol Use: 7


Number of Drinks Per Day: 1


Total Drinks Per Week: 7





- Recreational Drug Use


Recreational Drug Use: No





H&P Review of Systems





- Review of Systems:


Review Of Systems: See Below


General: Denies: Fever, Chills, Malaise, Weakness


HEENT: Denies: Dysphasia, Ear Pain, Eye Pain


Pulmonary: Denies: Shortness of Breath, Wheezing, Pleuritic Chest Pain


Cardiovascular: Denies: Chest Pain, Palpitations, Dyspnea on Exertion


Gastrointestinal: Reports: Anorexia, Decreased Appetite, Difficulty Swallowing. 

 Denies: Abdominal Pain, Black Stool, Bloody Stool, Constipation, Diarrhea, 

Distension, Flatus


Genitourinary: Denies: Dysuria, Frequency, Burning, Pain, Urgency


Musculoskeletal: Denies: Neck Pain, Shoulder Pain


Skin: Denies: Cyanosis, Jaundice, Mottled, Pallor, Diaphoresis


Psychiatric: Denies: Confusion, Depression, Mood Lability, Anxiety


Neurological: Denies: Confusion, Dizziness, Headache, Numbness, Difficulty 

Walking


Hematologic/Lymphatic: Reports: No Symptoms


Immunologic: Reports: No Symptoms





Exam





- Exam


Exam: See Below





- Vital Signs


Vital Signs: 


                                Last Vital Signs











Temp  96.4 F L  06/29/20 12:10


 


Pulse  96   06/29/20 12:10


 


Resp  17   06/29/20 12:10


 


BP  161/113 H  06/29/20 12:10


 


Pulse Ox  98   06/29/20 12:10











Weight: 145 lb





- Exam


General: Alert, Oriented, Cooperative, Moderate Distress


HEENT: Conjunctiva Clear, Nares Patent, Pupils Equal, Pupils Reactive.  No: 

Scleral Icterus


Neck: Supple, Trachea Midline


Lungs: Clear to Auscultation, Normal Respiratory Effort


Cardiovascular: Regular Rate, Regular Rhythm.  No: Systolic Murmur, Diastolic 

Murmur


GI/Abdominal Exam: Normal Bowel Sounds, Soft, Non-Tender, No Distention.  No: 

Guarding, Rigid, Rebound


 (Male) Exam: No Hernia, Normal Inspection


Rectal (Males) Exam: Deferred


Back Exam: Normal Inspection


Extremities: Normal Inspection, Normal Range of Motion, Non-Tender


Skin: Warm, Dry, Intact





- Patient Data


Lab Results Last 24 hrs: 


                         Laboratory Results - last 24 hr











  06/29/20 Range/Units





  13:15 


 


SARS-CoV-2 RNA (RT-PCR)  NEGATIVE  (NEGATIVE)  














Sepsis Event Note





- Evaluation


Sepsis Screening Result: No Definite Risk





- Focused Exam


Vital Signs: 


                                   Vital Signs











  Temp Pulse Resp BP Pulse Ox


 


 06/29/20 12:10  96.4 F L  96  17  161/113 H  98











Date Exam was Performed: 06/29/20


Time Exam was Performed: 13:53





Consult PN Assessment/Plan


Procedures: 


Procedures





ASSAY OF TROPONIN QUANT (01/20/18)


CHEST X-RAY 2VW FRONTAL&LATL (10/27/17)


COMPLETE CBC AUTOMATED (10/27/17)


COMPLETE CBC W/AUTO DIFF WBC (01/20/18)


COMPREHEN METABOLIC PANEL (01/20/18)


CT ABD & PELV 1/> REGNS (05/02/19)


CT ABD & PELV W/CONTRAST (08/10/18)


CT ABD & PELVIS W/O CONTRAST (10/25/17)


CT NECK SPINE W/O DYE (01/20/18)


DIAGNOSTIC COLONOSCOPY (11/04/14)


ECHO EXAM OF ABDOMEN (10/30/14)


EGD BIOPSY SINGLE/MULTIPLE (08/13/19)


EGD REMOVE FOREIGN BODY (08/13/19)


ELECTROCARDIOGRAM TRACING (01/20/18)


EMERGENCY DEPT VISIT (08/13/19)


EMERGENCY DEPT VISIT (01/20/18)


PROTHROMBIN TIME (01/20/18)


PRP I/ANURAG INIT BLOCK >5 YR (11/01/17)


ROUTINE VENIPUNCTURE (01/20/18)


THER/PROPH/DIAG INJ IV PUSH (08/13/19)


TISSUE EXAM BY PATHOLOGIST (11/04/14)


TX/PRO/DX INJ SAME DRUG ADON (01/20/18)


URINALYSIS AUTO W/SCOPE (10/25/17)


US EXAM SCROTUM (10/19/17)


X-RAY EXAM CHEST 1 VIEW (08/13/19)


X-RAY EXAM L-S SPINE 2/3 VWS (01/20/18)


X-RAY EXAM OF ELBOW (01/20/18)


X-RAY EXAM OF PELVIS (01/20/18)








(1) Esophageal foreign body


SNOMED Code(s): 21374448


   Code(s): T18.108A - UNSP FOREIGN BODY IN ESOPHAGUS CAUSING OTH INJURY, INIT  

 Priority: High   Current Visit: Yes   


Qualifiers: 


   Encounter type: initial encounter   Qualified Code(s): T18.108A - Unspecified

 foreign body in esophagus causing other injury, initial encounter   





(2) Esophageal obstruction due to food impaction


SNOMED Code(s): 585806427


   Code(s): K22.2 - ESOPHAGEAL OBSTRUCTION; T18.128A - FOOD IN ESOPHAGUS CAUSING

 OTHER INJURY, INITIAL ENCOUNTER   Priority: High   Current Visit: Yes   


Problem List Initiated/Reviewed/Updated: Yes


Plan: 


Esophagogastroduodenoscopy with removal of esophageal foreign body and biopsy. 

The operative procedure, along with the risks, including, but not limited to, 

bleeding, perforation, and the need for surgery were discussed with the patient 

who voices understanding, offers no questions and wishes to proceed.

## 2020-06-29 NOTE — PCM.POSTAN
POST ANESTHESIA ASSESSMENT





- MENTAL STATUS


Mental Status: Alert, Oriented





- VITAL SIGNS


Vital Signs: 


                                Last Vital Signs











Temp  36.6 C   06/29/20 15:42


 


Pulse  99   06/29/20 15:58


 


Resp  20   06/29/20 15:58


 


BP  116/77   06/29/20 15:58


 


Pulse Ox  96   06/29/20 15:58














- RESPIRATORY


Respiratory Status: Respiratory Rate WNL, Airway Patent, O2 Saturation Stable





- CARDIOVASCULAR


CV Status: Pulse Rate WNL, Blood Pressure Stable





- GASTROINTESTINAL


GI Status: No Symptoms





- POST OP HYDRATION


Hydration Status: Adequate & Stable

## 2020-06-29 NOTE — PCM.PREANE
Preanesthetic Assessment





- Anesthesia/Transfusion/Family Hx


Anesthesia History: Prior Anesthesia Without Reaction


Family History of Anesthesia Reaction: No


Transfusion History: No Prior Transfusion(s)





- Review of Systems


General: No Symptoms


Pulmonary: No Symptoms


Cardiovascular: No Symptoms


Gastrointestinal: Other (esoph fb)


Neurological: No Symptoms


Other: Reports: None





- Physical Assessment


Vital Signs: 





                                Last Vital Signs











Temp  97.3 F   06/29/20 14:01


 


Pulse  101 H  06/29/20 14:01


 


Resp  17   06/29/20 14:01


 


BP  164/109 H  06/29/20 14:01


 


Pulse Ox  98   06/29/20 14:01











Height: 5 ft 7 in


Weight: 65.771 kg


ASA Class: 4 (poorly controlled systemic disease with bp 161/113)


Mental Status: Alert & Oriented x3


Lungs: Clear to Auscultation, Normal Respiratory Effort


Cardiovascular: Regular Rate, Regular Rhythm





- Lab


Values: 





                             Laboratory Last Values











SARS-CoV-2 RNA (RT-PCR)  NEGATIVE  (NEGATIVE)   06/29/20  13:15    














- Allergies


Allergies/Adverse Reactions: 


                                    Allergies











Allergy/AdvReac Type Severity Reaction Status Date / Time


 


No Known Allergies Allergy   Verified 06/29/20 12:12














- Blood


Blood Available: No





- Anesthesia Plan


Pre-Op Medication Ordered: Other (apressoline iv)





- Acknowledgements


Anesthesia Type Planned: General Anesthesia (RSI)


Pt an Appropriate Candidate for the Planned Anesthesia: Yes


Alternatives and Risks of Anesthesia Discussed w Pt/Guardian: Yes


Pt/Guardian Understands and Agrees with Anesthesia Plan: Yes


Additional Comments: 





pmh: htn- has not taken his lisinopril for "several days". copd, smoker, 

psoriasis, AUD-in remission, hep C- treated, 


PLAN: controll BP preoperatively, GET for endoscopic procedure





PreAnesthesia Questionnaire


HEENT History: Reports: Other (See Below)


Other HEENT History: top and bottom denture


Cardiovascular History: Reports: Hypertension


Respiratory History: Reports: None


Gastrointestinal History: Reports: Hepatitis, Other (See Below)


Other Gastrointestinal History: occasional heartburn, hx hepatitis C but has be

en on medication for this and no longer has


Genitourinary History: Reports: None


Musculoskeletal History: Reports: Other (See Below)


Other Musculoskeletal History: chronic rt shoulder pain


Neurological History: Reports: None


Psychiatric History: Reports: None


Endocrine/Metabolic History: Reports: None


Hematologic History: Reports: None


Immunologic History: Reports: None


Oncologic (Cancer) History: Reports: None


Dermatologic History: Reports: Psoriasis





- Infectious Disease History


Infectious Disease History: Reports: Chicken Pox





- Past Surgical History


Head Surgeries/Procedures: Reports: None


GI Surgical History: Reports: Colonoscopy, EGD





- SUBSTANCE USE


Smoking Status *Q: Current Every Day Smoker


Days Per Week of Alcohol Use: 7


Number of Drinks Per Day: 1


Total Drinks Per Week: 7


Recreational Drug Use History: No





- HOME MEDS


Home Medications: 


                                    Home Meds





Lisinopril 20 mg PO DAILY 11/03/14 [History]











- CURRENT (IN HOUSE) MEDS


Current Meds: 





                               Current Medications





Hydralazine HCl (Apresoline)  5 mg IVPUSH ONETIME ONE


   Stop: 06/29/20 14:18


Lactated Ringer's (Ringers, Lactated)  1,000 mls @ 125 mls/hr IV ASDIRECTED WILBERTO





Discontinued Medications





Glucagon (Glucagen)  1 mg IVPUSH ONETIME ONE


   Stop: 06/29/20 12:13


   Last Admin: 06/29/20 12:14 Dose:  1 mg


   Documented by: 


Glucagon (Glucagen) Confirm Administered Dose 1 mg .ROUTE .STK-MED ONE


   Stop: 06/29/20 12:13


   Last Admin: 06/29/20 12:14 Dose:  Not Given


   Documented by:

## 2020-06-29 NOTE — PCM.PREANE
Preanesthetic Assessment





- Anesthesia/Transfusion/Family Hx


Anesthesia History: Prior Anesthesia Without Reaction


Family History of Anesthesia Reaction: No


Transfusion History: No Prior Transfusion(s)





- Review of Systems


General: No Symptoms


Pulmonary: No Symptoms


Cardiovascular: No Symptoms


Gastrointestinal: Other (esoph fb)


Other: Reports: None





- Physical Assessment


NPO Status Date: 06/29/20 (cl in ED)


Vital Signs: 





                                Last Vital Signs











Temp  97.3 F   06/29/20 14:01


 


Pulse  101 H  06/29/20 14:01


 


Resp  17   06/29/20 14:01


 


BP  164/109 H  06/29/20 14:01


 


Pulse Ox  98   06/29/20 14:01











Height: 5 ft 7 in


Weight: 65.771 kg


ASA Class: 4 (poorly controlled systemic disease with bp 161/113)


Dentition: Reports: Dentures (full upper), Partial (lower)


ROM/Head Extension: Full


Lungs: Clear to Auscultation, Normal Respiratory Effort


Cardiovascular: Regular Rate, Regular Rhythm





- Lab


Values: 





                             Laboratory Last Values











SARS-CoV-2 RNA (RT-PCR)  NEGATIVE  (NEGATIVE)   06/29/20  13:15    














- Allergies


Allergies/Adverse Reactions: 


                                    Allergies











Allergy/AdvReac Type Severity Reaction Status Date / Time


 


No Known Allergies Allergy   Verified 06/29/20 12:12














- Blood


Blood Available: No





- Anesthesia Plan


Pre-Op Medication Ordered: Other (apressoline)





- Acknowledgements


Anesthesia Type Planned: General Anesthesia (RSI)


Pt an Appropriate Candidate for the Planned Anesthesia: Yes


Alternatives and Risks of Anesthesia Discussed w Pt/Guardian: Yes


Pt/Guardian Understands and Agrees with Anesthesia Plan: Yes


Additional Comments: 





pmh: psoriasis, smoker, copd, htn- has not taken his lisinopril for "deveral 

days". hep C- has finished treatmtnt, 


PLAN: GET





PreAnesthesia Questionnaire


HEENT History: Reports: Other (See Below)


Other HEENT History: top and bottom denture


Cardiovascular History: Reports: Hypertension


Respiratory History: Reports: None


Gastrointestinal History: Reports: Hepatitis, Other (See Below)


Other Gastrointestinal History: occasional heartburn, hx hepatitis C but has 

been on medication for this and no longer has


Genitourinary History: Reports: None


Musculoskeletal History: Reports: Other (See Below)


Other Musculoskeletal History: chronic rt shoulder pain


Neurological History: Reports: None


Psychiatric History: Reports: None


Endocrine/Metabolic History: Reports: None


Hematologic History: Reports: None


Immunologic History: Reports: None


Oncologic (Cancer) History: Reports: None


Dermatologic History: Reports: Psoriasis





- Infectious Disease History


Infectious Disease History: Reports: Chicken Pox





- Past Surgical History


Head Surgeries/Procedures: Reports: None


GI Surgical History: Reports: Colonoscopy, EGD





- SUBSTANCE USE


Smoking Status *Q: Current Every Day Smoker


Days Per Week of Alcohol Use: 7


Number of Drinks Per Day: 1


Total Drinks Per Week: 7


Recreational Drug Use History: No





- HOME MEDS


Home Medications: 


                                    Home Meds





Lisinopril 20 mg PO DAILY 11/03/14 [History]











- CURRENT (IN HOUSE) MEDS


Current Meds: 





                               Current Medications





Lactated Ringer's (Ringers, Lactated)  1,000 mls @ 125 mls/hr IV ASDIRECTED WILBERTO





Discontinued Medications





Fentanyl (Sublimaze) Confirm Administered Dose 100 mcg .ROUTE .STK-MED ONE


   Stop: 06/29/20 14:25


Glucagon (Glucagen)  1 mg IVPUSH ONETIME ONE


   Stop: 06/29/20 12:13


   Last Admin: 06/29/20 12:14 Dose:  1 mg


   Documented by: 


Glucagon (Glucagen) Confirm Administered Dose 1 mg .ROUTE .STK-MED ONE


   Stop: 06/29/20 12:13


   Last Admin: 06/29/20 12:14 Dose:  Not Given


   Documented by: 


Glycopyrrolate (Robinul) Confirm Administered Dose 0.2 mg .ROUTE .STK-MED ONE


   Stop: 06/29/20 14:27


Hydralazine HCl (Apresoline)  5 mg IVPUSH ONETIME ONE


   Stop: 06/29/20 14:18


Lidocaine HCl (Xylocaine-Mpf 1%) Confirm Administered Dose 5 ml .ROUTE .STK-MED 

ONE


   Stop: 06/29/20 14:27


Midazolam HCl (Versed 1 Mg/Ml) Confirm Administered Dose 2 mg .ROUTE .STK-MED 

ONE


   Stop: 06/29/20 14:25


Ondansetron HCl (Zofran) Confirm Administered Dose 4 mg .ROUTE .STK-MED ONE


   Stop: 06/29/20 14:27


Propofol (Diprivan  20 Ml) Confirm Administered Dose 200 mg .ROUTE .STK-MED ONE


   Stop: 06/29/20 14:25

## 2022-02-05 ENCOUNTER — HOSPITAL ENCOUNTER (EMERGENCY)
Dept: HOSPITAL 56 - MW.ED | Age: 62
Discharge: HOME | End: 2022-02-05
Payer: OTHER GOVERNMENT

## 2022-02-05 DIAGNOSIS — Z79.899: ICD-10-CM

## 2022-02-05 DIAGNOSIS — R07.89: Primary | ICD-10-CM

## 2022-02-05 DIAGNOSIS — I10: ICD-10-CM

## 2022-02-05 DIAGNOSIS — Z20.822: ICD-10-CM

## 2022-02-05 LAB
BUN SERPL-MCNC: 11 MG/DL (ref 7–18)
CHLORIDE SERPL-SCNC: 100 MMOL/L (ref 98–107)
CO2 SERPL-SCNC: 28.2 MMOL/L (ref 21–32)
FLUAV RNA UPPER RESP QL NAA+PROBE: NEGATIVE
FLUBV RNA UPPER RESP QL NAA+PROBE: NEGATIVE
GLUCOSE SERPL-MCNC: 113 MG/DL (ref 74–106)
POTASSIUM SERPL-SCNC: 3.7 MMOL/L (ref 3.5–5.1)
SARS-COV-2 RNA RESP QL NAA+PROBE: NEGATIVE
SODIUM SERPL-SCNC: 139 MMOL/L (ref 136–148)

## 2022-02-05 PROCEDURE — 93005 ELECTROCARDIOGRAM TRACING: CPT

## 2022-02-05 PROCEDURE — 84484 ASSAY OF TROPONIN QUANT: CPT

## 2022-02-05 PROCEDURE — 96374 THER/PROPH/DIAG INJ IV PUSH: CPT

## 2022-02-05 PROCEDURE — 71275 CT ANGIOGRAPHY CHEST: CPT

## 2022-02-05 PROCEDURE — 71045 X-RAY EXAM CHEST 1 VIEW: CPT

## 2022-02-05 PROCEDURE — 80053 COMPREHEN METABOLIC PANEL: CPT

## 2022-02-05 PROCEDURE — 99285 EMERGENCY DEPT VISIT HI MDM: CPT

## 2022-02-05 PROCEDURE — 85379 FIBRIN DEGRADATION QUANT: CPT

## 2022-02-05 PROCEDURE — 85025 COMPLETE CBC W/AUTO DIFF WBC: CPT

## 2022-02-05 PROCEDURE — 0240U: CPT

## 2022-02-05 PROCEDURE — 36415 COLL VENOUS BLD VENIPUNCTURE: CPT

## 2022-06-06 ENCOUNTER — HOSPITAL ENCOUNTER (EMERGENCY)
Dept: HOSPITAL 56 - MW.ED | Age: 62
Discharge: HOME | End: 2022-06-06
Payer: OTHER GOVERNMENT

## 2022-06-06 DIAGNOSIS — Z79.899: ICD-10-CM

## 2022-06-06 DIAGNOSIS — R10.31: Primary | ICD-10-CM

## 2022-06-06 DIAGNOSIS — I10: ICD-10-CM

## 2022-06-06 LAB
BUN SERPL-MCNC: 11 MG/DL (ref 7–18)
CHLORIDE SERPL-SCNC: 100 MMOL/L (ref 98–107)
CO2 SERPL-SCNC: 29.1 MMOL/L (ref 21–32)
GLUCOSE SERPL-MCNC: 106 MG/DL (ref 74–106)
LIPASE SERPL-CCNC: 99 U/L (ref 73–393)
POTASSIUM SERPL-SCNC: 4.3 MMOL/L (ref 3.5–5.1)
SODIUM SERPL-SCNC: 136 MMOL/L (ref 136–148)

## 2022-06-06 PROCEDURE — 36415 COLL VENOUS BLD VENIPUNCTURE: CPT

## 2022-06-06 PROCEDURE — 80053 COMPREHEN METABOLIC PANEL: CPT

## 2022-06-06 PROCEDURE — 74177 CT ABD & PELVIS W/CONTRAST: CPT

## 2022-06-06 PROCEDURE — 99284 EMERGENCY DEPT VISIT MOD MDM: CPT

## 2022-06-06 PROCEDURE — 83690 ASSAY OF LIPASE: CPT

## 2022-06-06 PROCEDURE — 96374 THER/PROPH/DIAG INJ IV PUSH: CPT

## 2022-06-06 PROCEDURE — 85025 COMPLETE CBC W/AUTO DIFF WBC: CPT
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
